# Patient Record
Sex: MALE | Race: WHITE | NOT HISPANIC OR LATINO | Employment: FULL TIME | URBAN - METROPOLITAN AREA
[De-identification: names, ages, dates, MRNs, and addresses within clinical notes are randomized per-mention and may not be internally consistent; named-entity substitution may affect disease eponyms.]

---

## 2021-10-18 ENCOUNTER — APPOINTMENT (OUTPATIENT)
Dept: GENERAL RADIOLOGY | Facility: HOSPITAL | Age: 36
End: 2021-10-18

## 2021-10-18 ENCOUNTER — HOSPITAL ENCOUNTER (EMERGENCY)
Facility: HOSPITAL | Age: 36
Discharge: HOME | End: 2021-10-18
Attending: EMERGENCY MEDICINE | Admitting: EMERGENCY MEDICINE

## 2021-10-18 VITALS
WEIGHT: 290 LBS | RESPIRATION RATE: 16 BRPM | HEART RATE: 88 BPM | HEIGHT: 69 IN | OXYGEN SATURATION: 98 % | TEMPERATURE: 98.2 F | BODY MASS INDEX: 42.95 KG/M2 | DIASTOLIC BLOOD PRESSURE: 100 MMHG | SYSTOLIC BLOOD PRESSURE: 142 MMHG

## 2021-10-18 DIAGNOSIS — S39.012A STRAIN OF LUMBAR REGION, INITIAL ENCOUNTER: Primary | ICD-10-CM

## 2021-10-18 LAB
ALBUMIN SERPL-MCNC: 4.6 G/DL (ref 3.5–5.2)
ALBUMIN/GLOB SERPL: 1.6 G/DL
ALP SERPL-CCNC: 70 U/L (ref 39–117)
ALT SERPL W P-5'-P-CCNC: 28 U/L (ref 1–41)
ANION GAP SERPL CALCULATED.3IONS-SCNC: 9.4 MMOL/L (ref 5–15)
AST SERPL-CCNC: 21 U/L (ref 1–40)
BASOPHILS # BLD AUTO: 0.03 10*3/MM3 (ref 0–0.2)
BASOPHILS NFR BLD AUTO: 0.5 % (ref 0–1.5)
BILIRUB SERPL-MCNC: 0.3 MG/DL (ref 0–1.2)
BILIRUB UR QL STRIP: NEGATIVE
BUN SERPL-MCNC: 9 MG/DL (ref 6–20)
BUN/CREAT SERPL: 9.5 (ref 7–25)
CALCIUM SPEC-SCNC: 9.6 MG/DL (ref 8.6–10.5)
CHLORIDE SERPL-SCNC: 103 MMOL/L (ref 98–107)
CLARITY UR: CLEAR
CO2 SERPL-SCNC: 27.6 MMOL/L (ref 22–29)
COLOR UR: YELLOW
CREAT SERPL-MCNC: 0.95 MG/DL (ref 0.76–1.27)
DEPRECATED RDW RBC AUTO: 43.7 FL (ref 37–54)
EOSINOPHIL # BLD AUTO: 0.04 10*3/MM3 (ref 0–0.4)
EOSINOPHIL NFR BLD AUTO: 0.6 % (ref 0.3–6.2)
ERYTHROCYTE [DISTWIDTH] IN BLOOD BY AUTOMATED COUNT: 13 % (ref 12.3–15.4)
GFR SERPL CREATININE-BSD FRML MDRD: 89 ML/MIN/1.73
GLOBULIN UR ELPH-MCNC: 2.9 GM/DL
GLUCOSE SERPL-MCNC: 94 MG/DL (ref 65–99)
GLUCOSE UR STRIP-MCNC: NEGATIVE MG/DL
HCT VFR BLD AUTO: 45.8 % (ref 37.5–51)
HGB BLD-MCNC: 15.7 G/DL (ref 13–17.7)
HGB UR QL STRIP.AUTO: NEGATIVE
HOLD SPECIMEN: NORMAL
HOLD SPECIMEN: NORMAL
IMM GRANULOCYTES # BLD AUTO: 0.01 10*3/MM3 (ref 0–0.05)
IMM GRANULOCYTES NFR BLD AUTO: 0.2 % (ref 0–0.5)
KETONES UR QL STRIP: NEGATIVE
LEUKOCYTE ESTERASE UR QL STRIP.AUTO: NEGATIVE
LIPASE SERPL-CCNC: 26 U/L (ref 13–60)
LYMPHOCYTES # BLD AUTO: 1.81 10*3/MM3 (ref 0.7–3.1)
LYMPHOCYTES NFR BLD AUTO: 27.6 % (ref 19.6–45.3)
MCH RBC QN AUTO: 31.3 PG (ref 26.6–33)
MCHC RBC AUTO-ENTMCNC: 34.3 G/DL (ref 31.5–35.7)
MCV RBC AUTO: 91.4 FL (ref 79–97)
MONOCYTES # BLD AUTO: 0.47 10*3/MM3 (ref 0.1–0.9)
MONOCYTES NFR BLD AUTO: 7.2 % (ref 5–12)
NEUTROPHILS NFR BLD AUTO: 4.19 10*3/MM3 (ref 1.7–7)
NEUTROPHILS NFR BLD AUTO: 63.9 % (ref 42.7–76)
NITRITE UR QL STRIP: NEGATIVE
NRBC BLD AUTO-RTO: 0 /100 WBC (ref 0–0.2)
PH UR STRIP.AUTO: 8 [PH] (ref 5–8)
PLATELET # BLD AUTO: 219 10*3/MM3 (ref 140–450)
PMV BLD AUTO: 8.8 FL (ref 6–12)
POTASSIUM SERPL-SCNC: 4.6 MMOL/L (ref 3.5–5.2)
PROT SERPL-MCNC: 7.5 G/DL (ref 6–8.5)
PROT UR QL STRIP: NEGATIVE
RBC # BLD AUTO: 5.01 10*6/MM3 (ref 4.14–5.8)
SODIUM SERPL-SCNC: 140 MMOL/L (ref 136–145)
SP GR UR STRIP: 1.02 (ref 1–1.03)
UROBILINOGEN UR QL STRIP: NORMAL
WBC # BLD AUTO: 6.55 10*3/MM3 (ref 3.4–10.8)
WHOLE BLOOD HOLD SPECIMEN: NORMAL
WHOLE BLOOD HOLD SPECIMEN: NORMAL

## 2021-10-18 PROCEDURE — 85025 COMPLETE CBC W/AUTO DIFF WBC: CPT

## 2021-10-18 PROCEDURE — 72110 X-RAY EXAM L-2 SPINE 4/>VWS: CPT

## 2021-10-18 PROCEDURE — 81003 URINALYSIS AUTO W/O SCOPE: CPT

## 2021-10-18 PROCEDURE — 36415 COLL VENOUS BLD VENIPUNCTURE: CPT

## 2021-10-18 PROCEDURE — 99283 EMERGENCY DEPT VISIT LOW MDM: CPT

## 2021-10-18 PROCEDURE — 83690 ASSAY OF LIPASE: CPT

## 2021-10-18 PROCEDURE — 80053 COMPREHEN METABOLIC PANEL: CPT

## 2021-10-18 RX ORDER — HYDROCODONE BITARTRATE AND ACETAMINOPHEN 7.5; 325 MG/1; MG/1
1 TABLET ORAL ONCE
Status: COMPLETED | OUTPATIENT
Start: 2021-10-18 | End: 2021-10-18

## 2021-10-18 RX ORDER — BACLOFEN 10 MG/1
10 TABLET ORAL ONCE
Status: COMPLETED | OUTPATIENT
Start: 2021-10-18 | End: 2021-10-18

## 2021-10-18 RX ORDER — DICLOFENAC SODIUM 75 MG/1
75 TABLET, DELAYED RELEASE ORAL 2 TIMES DAILY PRN
Qty: 20 TABLET | Refills: 0 | Status: SHIPPED | OUTPATIENT
Start: 2021-10-18

## 2021-10-18 RX ORDER — BACLOFEN 10 MG/1
10 TABLET ORAL 3 TIMES DAILY PRN
Qty: 20 TABLET | Refills: 0 | Status: SHIPPED | OUTPATIENT
Start: 2021-10-18

## 2021-10-18 RX ORDER — SODIUM CHLORIDE 0.9 % (FLUSH) 0.9 %
10 SYRINGE (ML) INJECTION AS NEEDED
Status: DISCONTINUED | OUTPATIENT
Start: 2021-10-18 | End: 2021-10-18 | Stop reason: HOSPADM

## 2021-10-18 RX ADMIN — BACLOFEN 10 MG: 10 TABLET ORAL at 15:08

## 2021-10-18 RX ADMIN — HYDROCODONE BITARTRATE AND ACETAMINOPHEN 1 TABLET: 7.5; 325 TABLET ORAL at 15:08

## 2021-10-18 NOTE — ED PROVIDER NOTES
EMERGENCY DEPARTMENT ENCOUNTER    Room Number:  A01/01  Date of encounter:  10/18/2021  PCP: Mauro Marx MD  Historian: Patient      I used full protective equipment while examining this patient.  This includes face mask, gloves and protective eyewear.  I washed my hands before entering the room and immediately upon leaving the room      HPI:  Chief Complaint: Low back pain  A complete HPI/ROS/PMH/PSH/SH/FH are unobtainable due to: Nothing    Context: Cody Conte is a 36 y.o. male who presents to the ED c/o sudden onset of left low back pain last night.  Patient states that his pain started gradually yesterday evening.  Patient had been moving a ladder around earlier in the day however denies any specific injury.  The pain is localized to the left low back.  There is no radiation of pain.  The pain is worse with movement, and improves with rest he denies any dysuria, hematuria, nausea, vomiting.  Denies any prior history of back trouble..  The pain is described as sharp, stabbing, moderate in intensity.    Review of Medical Records  No pertinent previous medical records found in [facility].    PAST MEDICAL HISTORY  Active Ambulatory Problems     Diagnosis Date Noted   • No Active Ambulatory Problems     Resolved Ambulatory Problems     Diagnosis Date Noted   • No Resolved Ambulatory Problems     No Additional Past Medical History         PAST SURGICAL HISTORY  No past surgical history on file.      FAMILY HISTORY  No family history on file.      SOCIAL HISTORY  Social History     Socioeconomic History   • Marital status:          ALLERGIES  Patient has no known allergies.        REVIEW OF SYSTEMS  All systems reviewed and negative except for those discussed in HPI.       PHYSICAL EXAM    I have reviewed the triage vital signs and nursing notes.    ED Triage Vitals   Temp Heart Rate Resp BP SpO2   10/18/21 1317 10/18/21 1317 10/18/21 1317 10/18/21 1429 10/18/21 1317   98.2 °F (36.8 °C) 88 16 142/100 98 %       Temp src Heart Rate Source Patient Position BP Location FiO2 (%)   10/18/21 1317 10/18/21 1317 -- -- --   Tympanic Monitor          Physical Exam  GENERAL: Alert, oriented, not distressed  HENT: head atraumatic, no nuchal rigidity  EYES: no scleral icterus, EOMI  CV: regular rhythm, regular rate, no murmur  RESPIRATORY: normal effort, CTA  ABDOMEN: soft, nontender  MUSCULOSKELETAL: Mild tenderness to left lower lumbar area.  No vertebral tenderness.  Decreased range of motion of lumbar spine.  NEURO: 5/5 strength in lower extremities.  Sensation grossly intact.  2+ patellar reflexes.  SKIN: warm, dry        LAB RESULTS  Recent Results (from the past 24 hour(s))   Comprehensive Metabolic Panel    Collection Time: 10/18/21  2:31 PM    Specimen: Blood   Result Value Ref Range    Glucose 94 65 - 99 mg/dL    BUN 9 6 - 20 mg/dL    Creatinine 0.95 0.76 - 1.27 mg/dL    Sodium 140 136 - 145 mmol/L    Potassium 4.6 3.5 - 5.2 mmol/L    Chloride 103 98 - 107 mmol/L    CO2 27.6 22.0 - 29.0 mmol/L    Calcium 9.6 8.6 - 10.5 mg/dL    Total Protein 7.5 6.0 - 8.5 g/dL    Albumin 4.60 3.50 - 5.20 g/dL    ALT (SGPT) 28 1 - 41 U/L    AST (SGOT) 21 1 - 40 U/L    Alkaline Phosphatase 70 39 - 117 U/L    Total Bilirubin 0.3 0.0 - 1.2 mg/dL    eGFR Non African Amer 89 >60 mL/min/1.73    Globulin 2.9 gm/dL    A/G Ratio 1.6 g/dL    BUN/Creatinine Ratio 9.5 7.0 - 25.0    Anion Gap 9.4 5.0 - 15.0 mmol/L   Lipase    Collection Time: 10/18/21  2:31 PM    Specimen: Blood   Result Value Ref Range    Lipase 26 13 - 60 U/L   Green Top (Gel)    Collection Time: 10/18/21  2:31 PM   Result Value Ref Range    Extra Tube Hold for add-ons.    Lavender Top    Collection Time: 10/18/21  2:31 PM   Result Value Ref Range    Extra Tube hold for add-on    Gold Top - SST    Collection Time: 10/18/21  2:31 PM   Result Value Ref Range    Extra Tube Hold for add-ons.    Light Blue Top    Collection Time: 10/18/21  2:31 PM   Result Value Ref Range    Extra Tube  hold for add-on    CBC Auto Differential    Collection Time: 10/18/21  2:31 PM    Specimen: Blood   Result Value Ref Range    WBC 6.55 3.40 - 10.80 10*3/mm3    RBC 5.01 4.14 - 5.80 10*6/mm3    Hemoglobin 15.7 13.0 - 17.7 g/dL    Hematocrit 45.8 37.5 - 51.0 %    MCV 91.4 79.0 - 97.0 fL    MCH 31.3 26.6 - 33.0 pg    MCHC 34.3 31.5 - 35.7 g/dL    RDW 13.0 12.3 - 15.4 %    RDW-SD 43.7 37.0 - 54.0 fl    MPV 8.8 6.0 - 12.0 fL    Platelets 219 140 - 450 10*3/mm3    Neutrophil % 63.9 42.7 - 76.0 %    Lymphocyte % 27.6 19.6 - 45.3 %    Monocyte % 7.2 5.0 - 12.0 %    Eosinophil % 0.6 0.3 - 6.2 %    Basophil % 0.5 0.0 - 1.5 %    Immature Grans % 0.2 0.0 - 0.5 %    Neutrophils, Absolute 4.19 1.70 - 7.00 10*3/mm3    Lymphocytes, Absolute 1.81 0.70 - 3.10 10*3/mm3    Monocytes, Absolute 0.47 0.10 - 0.90 10*3/mm3    Eosinophils, Absolute 0.04 0.00 - 0.40 10*3/mm3    Basophils, Absolute 0.03 0.00 - 0.20 10*3/mm3    Immature Grans, Absolute 0.01 0.00 - 0.05 10*3/mm3    nRBC 0.0 0.0 - 0.2 /100 WBC   Urinalysis With Microscopic If Indicated (No Culture) - Urine, Clean Catch    Collection Time: 10/18/21  2:49 PM    Specimen: Urine, Clean Catch   Result Value Ref Range    Color, UA Yellow Yellow, Straw    Appearance, UA Clear Clear    pH, UA 8.0 5.0 - 8.0    Specific Gravity, UA 1.022 1.005 - 1.030    Glucose, UA Negative Negative    Ketones, UA Negative Negative    Bilirubin, UA Negative Negative    Blood, UA Negative Negative    Protein, UA Negative Negative    Leuk Esterase, UA Negative Negative    Nitrite, UA Negative Negative    Urobilinogen, UA 0.2 E.U./dL 0.2 - 1.0 E.U./dL       Ordered the above labs and independently reviewed the results.        RADIOLOGY  XR Spine Lumbar Complete 4+VW    Result Date: 10/18/2021  XR SPINE LUMBAR COMPLETE 4+VW-  INDICATIONS: Low back pain  TECHNIQUE: 7 views of the lumbar spine  COMPARISON: None available  FINDINGS:  Mild retrolisthesis of L1 on L2. Alignment is otherwise in range of normal.  Lumbar vertebral body heights appear preserved. Multilevel endplate spurring is seen. No acute fracture is identified. Mild disc space narrowing at L1/2, L5/S1, lower thoracic levels.       Degenerative changes. No acute fracture identified. If there is further clinical concern, MRI could be considered for further evaluation.  This report was finalized on 10/18/2021 4:08 PM by Dr. Robin Gonzalez M.D.        I ordered the above noted radiological studies. Reviewed by me and discussed with radiologist.  See dictation for official radiology interpretation.      MEDICATIONS GIVEN IN ER    Medications   baclofen (LIORESAL) tablet 10 mg (10 mg Oral Given 10/18/21 1508)   HYDROcodone-acetaminophen (NORCO) 7.5-325 MG per tablet 1 tablet (1 tablet Oral Given 10/18/21 1508)         PROGRESS, DATA ANALYSIS, CONSULTS, AND MEDICAL DECISION MAKING    All labs have been independently reviewed by me.  All radiology studies have been reviewed by me and discussed with radiologist dictating the report.   EKG's independently viewed and interpreted by me.  Discussion below represents my analysis of pertinent findings related to patient's condition, differential diagnosis, treatment plan and final disposition.    I have discussed case with Dr. Samuel, emergency room physician.  He has performed his own bedside examination and agrees with treatment plan.    ED Course as of 10/18/21 1859   Mon Oct 18, 2021   1502 Patient presents with 2-day history of left low back pain.  Differential diagnoses include but limited to muscular strain, sciatica, ureterolithiasis. [EE]   1532 WBC: 6.55 [EE]   1532 Hemoglobin: 15.7 [EE]   1532 Creatinine: 0.95 [EE]   1532 Potassium: 4.6 [EE]   1532 Blood, UA: Negative  Low suspicion of ureterolithiasis.  Suspect muscular strain.  Plan for x-rays of lumbar spine. [EE]   1616 Lumbar spine films interpreted myself show no acute fracture or malalignment.  Mild degenerative changes.Updated patient on negative  findings.  Suspect his pain is likely musculoskeletal.  Will treat with NSAIDs and muscle relaxers. [EE]      ED Course User Index  [EE] Raulito Wilkerson PA       AS OF 18:59 EDT VITALS:    BP - 142/100  HR - 88  TEMP - 98.2 °F (36.8 °C) (Tympanic)  O2 SATS - 98%        DIAGNOSIS  Final diagnoses:   Strain of lumbar region, initial encounter         DISPOSITION  Discharged           Raulito Wilkerson PA  10/18/21 [id number]

## 2021-10-18 NOTE — ED PROVIDER NOTES
Pt presents to the ED c/o  left low back pain for last couple days. States that he had been up on a ladder trimming tree limbs and carrying a ladder recently but no significant acute injury that he can recall. Pain is nonradiating, no urinary issues. No fevers, chills, vomiting, diarrhea. Worse with movement.     On exam,   General: Awake, alert, no acute distress  HEENT: EOMI  Pulm: Symmetric chest rise, nonlabored breathing  CV: Regular rate and rhythm  GI: Non-distended, nontender  MSK: No deformity, left lumbosacral paraspinal muscle tenderness palpation without midline pain  Skin: Warm, dry  Neuro: Alert and oriented x 3, moving all extremities, no focal deficits  Psych: Calm, cooperative    Vital signs and nursing notes reviewed.       N95, protective eye goggles, and gloves used during this encounter. Patient in surgical mask.      Plan:   ED Course as of 10/18/21 1619   Mon Oct 18, 2021   1502 Patient presents with 2-day history of left low back pain.  Differential diagnoses include but limited to muscular strain, sciatica, ureterolithiasis. [EE]   1532 WBC: 6.55 [EE]   1532 Hemoglobin: 15.7 [EE]   1532 Creatinine: 0.95 [EE]   1532 Potassium: 4.6 [EE]   1532 Blood, UA: Negative  Low suspicion of ureterolithiasis.  Suspect muscular strain.  Plan for x-rays of lumbar spine. [EE]   1616 Lumbar spine films interpreted myself show no acute fracture or malalignment.  Mild degenerative changes.Updated patient on negative findings.  Suspect his pain is likely musculoskeletal.  Will treat with NSAIDs and muscle relaxers. [EE]      ED Course User Index  [EE] Raulito Wilkerson PA     Reassuring imaging is, exam is most consistent with paraspinal lumbosacral muscle strain versus spasm, recommend supportive care, anti-inflammatories, muscle relaxers, stretching and heat, good hydration, PCP follow-up, ED return for worsening symptoms as needed.     Attestation:  The [facility] and I have discussed this patient's history, physical  exam, and treatment plan.  I have reviewed the documentation and personally had a face to face interaction with the patient. I affirm the documentation and agree with the treatment and plan.  The attached note describes my personal findings.          Mauro Samuel MD  10/18/21 [id number]

## 2021-10-18 NOTE — ED TRIAGE NOTES
Left flank pain started last night.  He denies any urinary symptoms.  nki    Patient was placed in face mask during first look triage.  Patient was wearing a face mask throughout encounter.  I wore personal protective equipment throughout the encounter.  Hand hygiene was performed before and after patient encounter.